# Patient Record
Sex: FEMALE | Race: WHITE | Employment: OTHER | URBAN - METROPOLITAN AREA
[De-identification: names, ages, dates, MRNs, and addresses within clinical notes are randomized per-mention and may not be internally consistent; named-entity substitution may affect disease eponyms.]

---

## 2022-08-01 ENCOUNTER — OFFICE VISIT (OUTPATIENT)
Dept: NEUROLOGY | Age: 57
End: 2022-08-01
Payer: OTHER GOVERNMENT

## 2022-08-01 VITALS
BODY MASS INDEX: 28.82 KG/M2 | HEART RATE: 96 BPM | HEIGHT: 65 IN | WEIGHT: 173 LBS | SYSTOLIC BLOOD PRESSURE: 122 MMHG | DIASTOLIC BLOOD PRESSURE: 89 MMHG

## 2022-08-01 DIAGNOSIS — G24.3 CERVICAL DYSTONIA: ICD-10-CM

## 2022-08-01 DIAGNOSIS — G56.23 ENTRAPMENT OF BOTH ULNAR NERVES AT ELBOW: Primary | ICD-10-CM

## 2022-08-01 DIAGNOSIS — G56.21 ULNAR NERVE ENTRAPMENT AT ELBOW, RIGHT: ICD-10-CM

## 2022-08-01 DIAGNOSIS — R20.2 NUMBNESS AND TINGLING IN BOTH HANDS: ICD-10-CM

## 2022-08-01 DIAGNOSIS — R20.8 BURNING SENSATION OF FEET: ICD-10-CM

## 2022-08-01 DIAGNOSIS — G56.22 ULNAR NEUROPATHY OF LEFT UPPER EXTREMITY: ICD-10-CM

## 2022-08-01 DIAGNOSIS — R20.0 NUMBNESS AND TINGLING IN BOTH HANDS: ICD-10-CM

## 2022-08-01 DIAGNOSIS — G56.03 BILATERAL CARPAL TUNNEL SYNDROME: ICD-10-CM

## 2022-08-01 PROCEDURE — 99204 OFFICE O/P NEW MOD 45 MIN: CPT | Performed by: PSYCHIATRY & NEUROLOGY

## 2022-08-01 PROCEDURE — 95885 MUSC TST DONE W/NERV TST LIM: CPT | Performed by: PSYCHIATRY & NEUROLOGY

## 2022-08-01 PROCEDURE — 95913 NRV CNDJ TEST 13/> STUDIES: CPT | Performed by: PSYCHIATRY & NEUROLOGY

## 2022-08-01 NOTE — PROGRESS NOTES
8/1/2022  Moon Ulloa 61 y.o. female      Seen at the request of [unfilled]    Chief Complaint:  Chief Complaint   Patient presents with    Numbness            HPI:   Here for nerve conduction study of bilateral upper limb, additional time was spent for the discussion of findings, evaluation for other neurological concerns, and management. Patient was referred for EMG/NCV of the upper extremities for numbness and tinging in the 4th and 5th digits L>R. No significant neck pain, no hx of DM. Hx of bilateral CTR back in 2000. Patient also reports burning in the feet, she will return for NCV lower extremities once [facility]. Cervical dystonia was evaluated by a neurologist in the past.  She tried 1 time Botox therapy and gave her side effect of neck weakness. She carries diagnosis of cervical spine spondylosis. IMAGING REVIEW: I have reviewed imaging study - none in system, Labs - CBC CMP normal.      Past Medical History:  Cervical dystonia  Cervical spinal spondylosis  Bilateral carpal tunnel syndrome, status post release    Past Surgical History:  No past surgical history on file.     Social History:  Social History     Socioeconomic History    Marital status:      Spouse name: Not on file    Number of children: Not on file    Years of education: Not on file    Highest education level: Not on file   Occupational History    Not on file   Tobacco Use    Smoking status: Not on file    Smokeless tobacco: Not on file   Substance and Sexual Activity    Alcohol use: Not on file    Drug use: Not on file    Sexual activity: Not on file   Other Topics Concern    Not on file   Social History Narrative    Not on file     Social Determinants of Health     Financial Resource Strain: Not on file   Food Insecurity: Not on file   Transportation Needs: Not on file   Physical Activity: Not on file   Stress: Not on file   Social Connections: Not on file   Intimate Partner Violence: Not on file   Housing Stability: Not on file       Family History:   No family history on file. No current outpatient medications on file. No current facility-administered medications for this visit. Not on File    Review of Systems:  ROS    No flowsheet data found. No flowsheet data found. Extended / Orthostatic Vitals:      Vitals:    08/01/22 0910   BP: 122/89   Site: Left Upper Arm   Pulse: 96   Weight: 173 lb (78.5 kg)   Height: 5' 5\" (1.651 m)        Physical Exam  Vitals reviewed. Constitutional:       Appearance: She is normal weight. HENT:      Head: Normocephalic. Eyes:      Extraocular Movements: Extraocular movements intact and EOM normal.      Conjunctiva/sclera: Conjunctivae normal.      Pupils: Pupils are equal, round, and reactive to light. Cardiovascular:      Rate and Rhythm: Normal rate. Pulmonary:      Effort: Pulmonary effort is normal.   Musculoskeletal:         General: Normal range of motion. Cervical back: Normal range of motion. Skin:     General: Skin is warm and dry. Neurological:      General: No focal deficit present. Mental Status: She is alert and oriented to person, place, and time. Cranial Nerves: No cranial nerve deficit. Sensory: No sensory deficit. Motor: No weakness. Coordination: Coordination normal. Finger-Nose-Finger Test normal.      Gait: Gait normal.      Deep Tendon Reflexes: Strength normal. Reflexes normal.      Reflex Scores:       Tricep reflexes are 2+ on the right side and 2+ on the left side. Bicep reflexes are 2+ on the right side and 2+ on the left side. Brachioradialis reflexes are 2+ on the right side and 2+ on the left side. Patellar reflexes are 2+ on the right side and 2+ on the left side. Achilles reflexes are 2+ on the right side and 2+ on the left side.   Psychiatric:         Mood and Affect: Mood normal.         Speech: Speech normal.         Behavior: Behavior normal.         Thought Content: Thought content normal.         Judgment: Judgment normal.        Neurologic Exam     Mental Status   Oriented to person, place, and time. Concentration: normal.   Speech: speech is normal   Level of consciousness: alert  Knowledge: good. Normal comprehension. Cranial Nerves     CN II   Visual fields full to confrontation. Visual acuity: normal  Right visual field deficit: none  Left visual field deficit: none     CN III, IV, VI   Pupils are equal, round, and reactive to light. Extraocular motions are normal.   Right pupil: Size: 3 mm. Shape: regular. Left pupil: Size: 3 mm. Shape: regular. Nystagmus: none   Diplopia: none  Ophthalmoparesis: none  Upgaze: normal    CN V   Facial sensation intact. CN VII   Facial expression full, symmetric. CN IX, X   CN IX normal.   CN X normal.     CN XI   CN XI normal.     CN XII   CN XII normal.     Motor Exam   Muscle bulk: normal  Overall muscle tone: normal  Right arm pronator drift: absent  Left arm pronator drift: absent    Strength   Strength 5/5 throughout. Finger tapping normal. Strength 5/5 on bilateral upper and lower limbs. Hand muscles all normal including APB, FDI, 5th FDP and FPL bilaterally. No obvious atrophy.       Sensory Exam   Light touch normal.   Right arm light touch: normal  Left arm light touch: normal  Right leg light touch: normal  Left leg light touch: normal  Vibration normal.   Right leg vibration: normal  Left leg vibration: normal  Pinprick normal.   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 Needle EMG study was negative for axonal loss of ulnar motor nerves, see a  separate report. Patient will return for evaluation of burning feet. For cervical dystonia, would recommend a separate consultation to movement disorder subspecialist, Dr. Alan Pierre in our practice. I have spent 45 min, greater than 50% of discussing and counseling with patient, for treatment and diagnostic plan review.

## 2022-08-01 NOTE — PROGRESS NOTES
[address] [address] [address] [facility]       Nerve Conduction Study and Electromyogram Report           Hx: 61 y.o. RH female referred for EMG/NCV of the upper extremities for numbness and tinging in the 4th and 5th digits L>R. No hx of DM, no radiculopathic pain. Hx of bilateral CTR back in 2000. Patient also reports burning in the feet, she will return for NCV lower extremities once [facility]. Clinical summary was reviewed. Neurological examination: Motor power showed normal. There was no atrophy. There were no fasciculations. Deep tendon reflexes were present and symmetrical at 2+. Sensory exam was normal. Gait stable. Description: Nerve conduction studies were performed on the right upper extremity and left upper extremity, using standard technique. Bilateral median sensory response was normal.  Bilateral ulnar sensory amplitude borderline normal, CV reduced 43 right and 41 left. Bilateral radial sensory was normal.  Bilateral transcarpal study showed median peak latency delay by 0.61 MS on the right and 0.54 MS on the left. Bilateral median motor response was normal.  Left ulnar motor recording from ADM and FDI showed normal distal latencies, amplitudes mildly reduced with Chet-Jean feature, CV reduced across the elbow 54/42 ADM and 53/32 FDI. Right ulnar motor recording from ADM and FDI showed normal distal latencies, normal amplitude from ADM with CV 57/45; FDI amplitude mildly reduced with CV 52/44. F-wave latencies were normal range 24.2-25.6 MS except left ulnar relatively prolonged 27.9 MS. Needle electromyography was performed on the right upper extremity and left upper extremity, using standard concentric electrodes. Bilateral APB, FDI and ADM were all normal.        Conclusion: This study showed neurophysiologic evidence of several abnormalities 1) bilateral carpal tunnel syndrome, mild in degree.   2) bilateral ulnar nerve entrapment at the elbow, worse on the left, without axonal loss on EMG. Chet-Jean anastomosis was noted. 3) no evidence of polyneuropathy of upper limbs. Patient will benefit from hand specialist evaluation. She was advised to avoid over bending the elbow or compression against the elbow.            Procedure Details: Under procedure category          Arash Crawley MD

## 2022-08-03 ASSESSMENT — VISUAL ACUITY: VA_NORMAL: 1

## 2022-09-30 ENCOUNTER — TELEPHONE (OUTPATIENT)
Dept: NEUROLOGY | Age: 57
End: 2022-09-30

## 2022-09-30 NOTE — TELEPHONE ENCOUNTER
Patient is getting a new referral to be seen by Dr. Garber Arrowsmith, they no longer need to switch to Dr. Seven Manley.  Patient states that they wrong diagnosis was put on the original referral.

## 2022-09-30 NOTE — TELEPHONE ENCOUNTER
Patient has requested to switch providers due to feeling uncomfortable. Patient has a hearing disability and would prefer another doctor. Patient also stated that she would like to see Dr. Wilian Mg but I explained that he does not treat her diagnosis. Patient verbalized understanding. Pls adv.

## 2023-01-17 ENCOUNTER — OFFICE VISIT (OUTPATIENT)
Dept: ENT CLINIC | Age: 58
End: 2023-01-17
Payer: OTHER GOVERNMENT

## 2023-01-17 VITALS — HEART RATE: 122 BPM | BODY MASS INDEX: 27.82 KG/M2 | OXYGEN SATURATION: 98 % | HEIGHT: 65 IN | WEIGHT: 167 LBS

## 2023-01-17 DIAGNOSIS — R42 DIZZINESS: Primary | ICD-10-CM

## 2023-01-17 DIAGNOSIS — H61.23 BILATERAL IMPACTED CERUMEN: ICD-10-CM

## 2023-01-17 PROCEDURE — 99204 OFFICE O/P NEW MOD 45 MIN: CPT | Performed by: OTOLARYNGOLOGY

## 2023-01-17 RX ORDER — ROSUVASTATIN CALCIUM 10 MG/1
10 TABLET, COATED ORAL DAILY
COMMUNITY

## 2023-01-17 ASSESSMENT — ENCOUNTER SYMPTOMS
ABDOMINAL PAIN: 0
SHORTNESS OF BREATH: 0

## 2023-01-17 NOTE — PROGRESS NOTES
Chief Complaint   Patient presents with    Cough     Patient states that she has irritating cough at time that might last an hour that comes and goes and that is more of a itching feeling. HPI:  Genesis Davalos is a 61 y.o. female seen today in initial consultation for her ears. She has known SNHL and wears hearing aids bilaterally. She has noted worsening dizziness, imbalance, and head tremors. There is no room-spinning vertigo. She was diagnosed with cervical dystonia and even underwent previous Botox injection. She has upcoming appointment to be reevaluated by Neurology. She reports severe tinnitus and even a \"banging\" noise in her ears. She wonders if she may even be a cochlear implant candidate. Past Medical History, Past Surgical History, Family history, Social History, and Medications were all reviewed with the patient today and updated as necessary. 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 History reviewed. No pertinent past medical history. Social History     Tobacco Use    Smoking status: Not on file    Smokeless tobacco: Not on file   Substance Use Topics    Alcohol use: Not on file     No past surgical history on file. No family history on file.      ROS:    Review of Systems   Constitutional:  Negative for fever. HENT:  Positive for hearing loss and tinnitus. Eyes:  Negative for visual disturbance. Respiratory:  Negative for shortness of breath. Cardiovascular:  Negative for chest pain. Gastrointestinal:  Negative for abdominal pain. Skin:  Negative for rash. Neurological:  Negative for dizziness and headaches. Hematological:  Negative for adenopathy. Psychiatric/Behavioral:  Negative for agitation. PHYSICAL EXAM:    Pulse (!) 122   Ht 5' 5\" (1.651 m)   Wt 167 lb (75.8 kg)   SpO2 98%   BMI 27.79 kg/m²     General: NAD, well-appearing  Neuro: No gross neuro deficits. CN's II-XII intact. No facial weakness. Eyes: EOMI. Pupils reactive. No periorbital edema/ecchymosis. No nystagmus. Skin: No facial erythema, rashes or concerning lesions. Nose: No external deviations or saddling. Intranasally, septum is midline without perforations, nasal mucosa appears healthy with no erythema, mucopurulence, or polyps. Mouth: Moist mucus membranes, normal tongue/palate mobility, no concerning mucosal lesions. Oropharynx clear with no erythema/exudate, no tonsillar hypertrophy. Ears: Normal appearing auricles, no hematomas. EACs have bilateral cerumen impaction, healthy canal skin, TM's intact with no perforations or retraction pockets. No middle ear effusions. Neck: Soft, supple, no palpable lateral neck masses. No palpable parotid or submandibular masses. No thyromegaly or palpable thyroid nodules. No surgical scars. Lymphatics: No palpable cervical LAD. Resp: No audible stridor or wheezing. CV: No murmurs, no JVD. Extremities: No clubbing or cyanosis. ASSESSMENT and PLAN      ICD-10-CM    1. Dizziness  R42       2. Bilateral impacted cerumen  H61.23           She had bilateral cerumen impaction and I cleaned out her ears today under the microscope. She has underlying SNHL, but I do not think her dizziness, imbalance, and tremors are related to any peripheral vestibular dysfunction. I think her symptoms are mainly neurologic in etiology and she has follow-up soon to see Neurology. I reassured her of my findings and we will see her back as needed.     Claudine Tidwell MD  1/17/2023    Electronically signed by Claudine Tidwell MD on 1/17/2023 at 1:21 PM